# Patient Record
Sex: FEMALE | Race: WHITE | Employment: FULL TIME | URBAN - METROPOLITAN AREA
[De-identification: names, ages, dates, MRNs, and addresses within clinical notes are randomized per-mention and may not be internally consistent; named-entity substitution may affect disease eponyms.]

---

## 2024-04-29 SDOH — ECONOMIC STABILITY: HOUSING INSECURITY
IN THE LAST 12 MONTHS, WAS THERE A TIME WHEN YOU DID NOT HAVE A STEADY PLACE TO SLEEP OR SLEPT IN A SHELTER (INCLUDING NOW)?: NO

## 2024-04-29 SDOH — ECONOMIC STABILITY: FOOD INSECURITY: WITHIN THE PAST 12 MONTHS, THE FOOD YOU BOUGHT JUST DIDN'T LAST AND YOU DIDN'T HAVE MONEY TO GET MORE.: NEVER TRUE

## 2024-04-29 SDOH — ECONOMIC STABILITY: TRANSPORTATION INSECURITY
IN THE PAST 12 MONTHS, HAS LACK OF TRANSPORTATION KEPT YOU FROM MEETINGS, WORK, OR FROM GETTING THINGS NEEDED FOR DAILY LIVING?: NO

## 2024-04-29 SDOH — ECONOMIC STABILITY: INCOME INSECURITY: HOW HARD IS IT FOR YOU TO PAY FOR THE VERY BASICS LIKE FOOD, HOUSING, MEDICAL CARE, AND HEATING?: NOT HARD AT ALL

## 2024-04-29 SDOH — ECONOMIC STABILITY: FOOD INSECURITY: WITHIN THE PAST 12 MONTHS, YOU WORRIED THAT YOUR FOOD WOULD RUN OUT BEFORE YOU GOT MONEY TO BUY MORE.: NEVER TRUE

## 2024-05-02 ENCOUNTER — OFFICE VISIT (OUTPATIENT)
Age: 69
End: 2024-05-02
Payer: MEDICARE

## 2024-05-02 VITALS
SYSTOLIC BLOOD PRESSURE: 135 MMHG | HEIGHT: 64 IN | BODY MASS INDEX: 31.84 KG/M2 | HEART RATE: 80 BPM | DIASTOLIC BLOOD PRESSURE: 74 MMHG | WEIGHT: 186.5 LBS

## 2024-05-02 DIAGNOSIS — Z01.419 ENCOUNTER FOR ROUTINE GYNECOLOGIC EXAMINATION IN MEDICARE PATIENT: Primary | ICD-10-CM

## 2024-05-02 DIAGNOSIS — Z12.31 ENCOUNTER FOR SCREENING MAMMOGRAM FOR MALIGNANT NEOPLASM OF BREAST: ICD-10-CM

## 2024-05-02 PROCEDURE — G0101 CA SCREEN;PELVIC/BREAST EXAM: HCPCS | Performed by: OBSTETRICS & GYNECOLOGY

## 2024-05-02 ASSESSMENT — PATIENT HEALTH QUESTIONNAIRE - PHQ9
SUM OF ALL RESPONSES TO PHQ QUESTIONS 1-9: 0
2. FEELING DOWN, DEPRESSED OR HOPELESS: NOT AT ALL
1. LITTLE INTEREST OR PLEASURE IN DOING THINGS: NOT AT ALL
SUM OF ALL RESPONSES TO PHQ QUESTIONS 1-9: 0
SUM OF ALL RESPONSES TO PHQ9 QUESTIONS 1 & 2: 0

## 2024-05-02 ASSESSMENT — ENCOUNTER SYMPTOMS
COUGH: 0
BLOOD IN STOOL: 0
DIARRHEA: 0
SHORTNESS OF BREATH: 0
WHEEZING: 0
ABDOMINAL PAIN: 0
VOMITING: 0
NAUSEA: 0
CONSTIPATION: 0

## 2024-05-02 NOTE — PROGRESS NOTES
Patient presents for an annual gynecological exam. States she has no complaints. Reports having bilateral cataract surgery Aug 2023.

## 2024-05-02 NOTE — PROGRESS NOTES
Maira Vazquez   Is a 69-year-old nulligravida female with no PMB.  Not currently sexually active partner disease.  No change in PMH FH or allergy history.  Did have bilateral cataracts in 2023.  Denies anxiety, depression, self-harm or suicidal ideation.  No past or present history of physical sexual or verbal abuse.  Last Pap age 64.  Cologuard April 19, 2022 negative  Mammogram 5-9-2023 normal  DEXA unknown ordered by PCP.  Audrey Breast Cancer Risk: Audrey: 4.51 %    Patient presents for annual exam.     Past Medical History:   Diagnosis Date    Hyperlipidemia         Past Surgical History:   Procedure Laterality Date    CATARACT EXTRACTION Bilateral 2023    EYE SURGERY      4-6 months old    WISDOM TOOTH EXTRACTION          Family History   Problem Relation Age of Onset    Heart Surgery Mother     Lung Cancer Mother     Cancer Mother     Heart Surgery Father     Diabetes Sister     Aortic Stenosis Sister     Diabetes Brother     Prostate Cancer Brother           Current Outpatient Medications:     zolpidem (AMBIEN) 10 MG tablet, Take by mouth nightly as needed for Sleep., Disp: , Rfl:     calcium carbonate (OSCAL) 500 MG TABS tablet, Take 1 tablet by mouth daily, Disp: , Rfl:     rosuvastatin (CRESTOR) 20 MG tablet, Take 1 tablet by mouth daily, Disp: , Rfl:     Cholecalciferol (VITAMIN D3) 1.25 MG (39334 UT) CAPS, Take 1 capsule by mouth daily, Disp: , Rfl:     nystatin-triamcinolone (MYCOLOG II) 794519-3.1 UNIT/GM-% cream, Apply topically 2 times daily., Disp: 1 each, Rfl: 2     Allergies   Allergen Reactions    Cephalexin     Sulfa Antibiotics         Social History       Tobacco History       Smoking Status  Never      Smokeless Tobacco Use  Never              Alcohol History       Alcohol Use Status  Yes Drinks/Week  0 Glasses of wine, 3 Cans of beer, 0 Shots of liquor, 0 Drinks containing 0.5 oz of alcohol per week Amount  3.0 standard drinks of alcohol/wk Comment  Socially  not every

## 2024-05-28 DIAGNOSIS — Z12.31 ENCOUNTER FOR SCREENING MAMMOGRAM FOR MALIGNANT NEOPLASM OF BREAST: ICD-10-CM

## 2024-05-30 ENCOUNTER — TELEPHONE (OUTPATIENT)
Dept: OBGYN | Age: 69
End: 2024-05-30

## 2024-05-30 NOTE — TELEPHONE ENCOUNTER
----- Message from Nadja Shepherd DO sent at 5/28/2024  2:50 PM EDT -----  Mammogram revealed some microcalcifications in her left breast they are asking for additional views that order has been placed and I have faxed it to [facility]

## 2024-05-30 NOTE — TELEPHONE ENCOUNTER
Notified and she has appt set for next Monday, she also said she had a cyst on the left breast years ago

## 2024-06-04 DIAGNOSIS — R92.1 CALCIFICATION OF LEFT BREAST: Primary | ICD-10-CM

## 2024-06-04 DIAGNOSIS — R92.0 MICROCALCIFICATION OF LEFT BREAST ON MAMMOGRAPHY: ICD-10-CM

## 2024-06-04 NOTE — PROGRESS NOTES
Additional left breast views calcifications unchanged from 2023 they are requesting a short-term follow-up order was placed and faxed to [facility]

## 2024-06-05 NOTE — RESULT ENCOUNTER NOTE
Attempted calling patient yesterday and today. There are problems with cell phones and it has been having a busy signal. Message sent via [employer].

## 2024-12-10 ENCOUNTER — TELEPHONE (OUTPATIENT)
Dept: OBGYN | Age: 69
End: 2024-12-10

## 2024-12-10 DIAGNOSIS — Z12.31 ENCOUNTER FOR SCREENING MAMMOGRAM FOR MALIGNANT NEOPLASM OF BREAST: Primary | ICD-10-CM

## 2024-12-10 DIAGNOSIS — R92.1 CALCIFICATION OF LEFT BREAST: ICD-10-CM

## 2024-12-10 NOTE — TELEPHONE ENCOUNTER
----- Message from Dr. Nadja Shepherd, DO sent at 12/10/2024 11:02 AM EST -----  Calcifications in her left breast are stable they are recommending a follow-up in 6 months when she is due for bilateral mammogram order has been placed

## 2024-12-10 NOTE — PROGRESS NOTES
Category 3 left breast calcifications are stable she will be due for bilateral in March order was placed

## 2025-04-01 ENCOUNTER — TELEPHONE (OUTPATIENT)
Dept: OBGYN | Age: 70
End: 2025-04-01

## 2025-04-01 NOTE — TELEPHONE ENCOUNTER
Called patient to reschedule her annual apt 5/8,  Will not be in office that day.  Ok to schedule next available.

## 2025-05-21 ENCOUNTER — OFFICE VISIT (OUTPATIENT)
Age: 70
End: 2025-05-21
Payer: MEDICARE

## 2025-05-21 VITALS
TEMPERATURE: 98 F | OXYGEN SATURATION: 96 % | BODY MASS INDEX: 31.24 KG/M2 | WEIGHT: 183 LBS | HEIGHT: 64 IN | DIASTOLIC BLOOD PRESSURE: 84 MMHG | HEART RATE: 80 BPM | SYSTOLIC BLOOD PRESSURE: 133 MMHG

## 2025-05-21 DIAGNOSIS — Z12.31 ENCOUNTER FOR SCREENING MAMMOGRAM FOR MALIGNANT NEOPLASM OF BREAST: Primary | ICD-10-CM

## 2025-05-21 PROCEDURE — 99397 PER PM REEVAL EST PAT 65+ YR: CPT | Performed by: OBSTETRICS & GYNECOLOGY

## 2025-05-21 SDOH — ECONOMIC STABILITY: FOOD INSECURITY: WITHIN THE PAST 12 MONTHS, THE FOOD YOU BOUGHT JUST DIDN'T LAST AND YOU DIDN'T HAVE MONEY TO GET MORE.: NEVER TRUE

## 2025-05-21 SDOH — ECONOMIC STABILITY: FOOD INSECURITY: WITHIN THE PAST 12 MONTHS, YOU WORRIED THAT YOUR FOOD WOULD RUN OUT BEFORE YOU GOT MONEY TO BUY MORE.: NEVER TRUE

## 2025-05-21 ASSESSMENT — ENCOUNTER SYMPTOMS
DIARRHEA: 0
WHEEZING: 0
CONSTIPATION: 0
ABDOMINAL PAIN: 0
VOMITING: 0
SHORTNESS OF BREATH: 0
BLOOD IN STOOL: 0
NAUSEA: 0
COUGH: 0

## 2025-05-21 ASSESSMENT — PATIENT HEALTH QUESTIONNAIRE - PHQ9
SUM OF ALL RESPONSES TO PHQ QUESTIONS 1-9: 0
SUM OF ALL RESPONSES TO PHQ QUESTIONS 1-9: 0
1. LITTLE INTEREST OR PLEASURE IN DOING THINGS: NOT AT ALL
2. FEELING DOWN, DEPRESSED OR HOPELESS: NOT AT ALL
SUM OF ALL RESPONSES TO PHQ QUESTIONS 1-9: 0
SUM OF ALL RESPONSES TO PHQ QUESTIONS 1-9: 0

## 2025-05-21 NOTE — PROGRESS NOTES
Here today for her annual exam  Patient has complaints of bumps near underwear line  Discharge instructions have been discussed with the patient. Patient advised to call our office with any questions or concerns.   Voiced understanding.

## 2025-05-21 NOTE — PROGRESS NOTES
Maira Vazquez is a 70-year-old nulligravida no PMB.  Not currently sexually active her partner is .  Reports no change in PMH FH or allergy history.  Reports no anxiety, depression, self-harm or suicidal ideation.  No past or present history of physical, sexual or verbal abuse.  Last Pap age 64 and  Date of last Mammogram: 2024 category 3 short-term follow-up in December normal recommend bilateral in 6 months  Audrey Breast Cancer Risk: Audrey: 4.51%  Cologuard negative on 2025  Recent DEXA unknown result    GYN History  Abn pap  STI  LEEP/ cryo/cone  Uterine surgery  Ovary or tubal surgery    Patient presents for annual exam.     Past Medical History:   Diagnosis Date   • Hyperlipidemia         Past Surgical History:   Procedure Laterality Date   • CATARACT EXTRACTION Bilateral    • EYE SURGERY      4-6 months old   • WISDOM TOOTH EXTRACTION          Family History   Problem Relation Age of Onset   • Heart Surgery Mother    • Lung Cancer Mother    • Cancer Mother    • Heart Surgery Father    • Diabetes Sister    • Aortic Stenosis Sister    • Diabetes Brother    • Prostate Cancer Brother           Current Outpatient Medications:   •  zolpidem (AMBIEN) 10 MG tablet, Take by mouth nightly as needed for Sleep., Disp: , Rfl:   •  nystatin-triamcinolone (MYCOLOG II) 710534-1.1 UNIT/GM-% cream, Apply topically 2 times daily., Disp: 1 each, Rfl: 2  •  rosuvastatin (CRESTOR) 20 MG tablet, Take 1 tablet by mouth daily, Disp: , Rfl:   •  Cholecalciferol (VITAMIN D3) 1.25 MG (59111 UT) CAPS, Take 1 capsule by mouth daily, Disp: , Rfl:   •  calcium carbonate (OSCAL) 500 MG TABS tablet, Take 1 tablet by mouth daily, Disp: , Rfl:      Allergies   Allergen Reactions   • Cephalexin    • Sulfa Antibiotics         Social History       Tobacco History       Smoking Status  Never      Smokeless Tobacco Use  Never              Alcohol History       Alcohol Use Status  Yes Drinks/Week  0 Glasses of

## 2025-06-05 ENCOUNTER — RESULTS FOLLOW-UP (OUTPATIENT)
Age: 70
End: 2025-06-05

## 2025-06-05 DIAGNOSIS — Z12.31 ENCOUNTER FOR SCREENING MAMMOGRAM FOR MALIGNANT NEOPLASM OF BREAST: ICD-10-CM

## 2025-06-05 DIAGNOSIS — R92.1 BREAST CALCIFICATION, LEFT: Primary | ICD-10-CM

## 2025-06-05 NOTE — PROGRESS NOTES
Bilateral mammogram was performed calcifications in the left breast are stable they are recommending 1 more repeat in 6 months to follow the calcifications.  Order was placed will need to be sent to [facility]

## 2025-06-11 ENCOUNTER — RESULTS FOLLOW-UP (OUTPATIENT)
Dept: OBGYN | Age: 70
End: 2025-06-11